# Patient Record
Sex: MALE | Race: WHITE | NOT HISPANIC OR LATINO | URBAN - METROPOLITAN AREA
[De-identification: names, ages, dates, MRNs, and addresses within clinical notes are randomized per-mention and may not be internally consistent; named-entity substitution may affect disease eponyms.]

---

## 2024-06-04 ENCOUNTER — APPOINTMENT (OUTPATIENT)
Dept: URBAN - METROPOLITAN AREA CLINIC 224 | Age: 24
Setting detail: DERMATOLOGY
End: 2024-06-04

## 2024-06-04 DIAGNOSIS — L81.8 OTHER SPECIFIED DISORDERS OF PIGMENTATION: ICD-10-CM

## 2024-06-04 DIAGNOSIS — Z71.89 OTHER SPECIFIED COUNSELING: ICD-10-CM

## 2024-06-04 DIAGNOSIS — D22 MELANOCYTIC NEVI: ICD-10-CM

## 2024-06-04 DIAGNOSIS — L21.8 OTHER SEBORRHEIC DERMATITIS: ICD-10-CM

## 2024-06-04 PROBLEM — D22.5 MELANOCYTIC NEVI OF TRUNK: Status: ACTIVE | Noted: 2024-06-04

## 2024-06-04 PROCEDURE — OTHER COUNSELING: OTHER

## 2024-06-04 PROCEDURE — OTHER MIPS QUALITY: OTHER

## 2024-06-04 PROCEDURE — OTHER SUNSCREEN RECOMMENDATIONS: OTHER

## 2024-06-04 PROCEDURE — 99203 OFFICE O/P NEW LOW 30 MIN: CPT

## 2024-06-04 ASSESSMENT — LOCATION DETAILED DESCRIPTION DERM
LOCATION DETAILED: LEFT INFERIOR MEDIAL UPPER BACK
LOCATION DETAILED: POSTERIOR MID-PARIETAL SCALP
LOCATION DETAILED: LEFT MEDIAL INFERIOR CHEST
LOCATION DETAILED: SUPERIOR THORACIC SPINE
LOCATION DETAILED: EPIGASTRIC SKIN
LOCATION DETAILED: LEFT VENTRAL PROXIMAL FOREARM

## 2024-06-04 ASSESSMENT — LOCATION ZONE DERM
LOCATION ZONE: TRUNK
LOCATION ZONE: ARM
LOCATION ZONE: SCALP

## 2024-06-04 ASSESSMENT — LOCATION SIMPLE DESCRIPTION DERM
LOCATION SIMPLE: POSTERIOR SCALP
LOCATION SIMPLE: LEFT UPPER BACK
LOCATION SIMPLE: ABDOMEN
LOCATION SIMPLE: UPPER BACK
LOCATION SIMPLE: CHEST
LOCATION SIMPLE: LEFT FOREARM

## 2024-06-04 NOTE — PROCEDURE: COUNSELING
Detail Level: Detailed
Sunscreen Recommendations: Eucerin - samples provided
Detail Level: Simple
Patient Specific Counseling (Will Not Stick From Patient To Patient): Continue Head and Shoulders
Detail Level: Zone